# Patient Record
Sex: FEMALE | Race: WHITE
[De-identification: names, ages, dates, MRNs, and addresses within clinical notes are randomized per-mention and may not be internally consistent; named-entity substitution may affect disease eponyms.]

---

## 2022-05-09 ENCOUNTER — HOSPITAL ENCOUNTER (INPATIENT)
Dept: HOSPITAL 33 - OB | Age: 27
LOS: 3 days | Discharge: HOME | End: 2022-05-12
Attending: OBSTETRICS & GYNECOLOGY | Admitting: OBSTETRICS & GYNECOLOGY
Payer: COMMERCIAL

## 2022-05-09 ENCOUNTER — HOSPITAL ENCOUNTER (OUTPATIENT)
Dept: HOSPITAL 33 - OB | Age: 27
Setting detail: OBSERVATION
Discharge: HOME | End: 2022-05-09
Attending: OBSTETRICS & GYNECOLOGY | Admitting: OBSTETRICS & GYNECOLOGY
Payer: COMMERCIAL

## 2022-05-09 VITALS — OXYGEN SATURATION: 98 %

## 2022-05-09 VITALS — HEART RATE: 95 BPM

## 2022-05-09 VITALS — SYSTOLIC BLOOD PRESSURE: 133 MMHG | DIASTOLIC BLOOD PRESSURE: 73 MMHG

## 2022-05-09 DIAGNOSIS — Z3A.37: ICD-10-CM

## 2022-05-09 DIAGNOSIS — Z20.828: ICD-10-CM

## 2022-05-09 DIAGNOSIS — Z34.03: Primary | ICD-10-CM

## 2022-05-09 DIAGNOSIS — D62: ICD-10-CM

## 2022-05-09 LAB
AMPHETAMINES UR QL: NEGATIVE
BARBITURATES UR QL: NEGATIVE
BASOPHILS # BLD AUTO: 0.02 10*3/UL (ref 0–0.4)
BENZODIAZ UR QL SCN: NEGATIVE
COCAINE UR QL SCN: NEGATIVE
EOSINOPHIL # BLD AUTO: 0.01 10*3/UL (ref 0–0.5)
HCT VFR BLD AUTO: 32.4 % (ref 35–47)
HGB BLD-MCNC: 9.2 GM/DL (ref 12–16)
LYMPHOCYTES # SPEC AUTO: 1.08 10*3/UL (ref 1–4.6)
MCH RBC QN AUTO: 20.2 PG (ref 26–32)
MCHC RBC AUTO-ENTMCNC: 28.4 G/DL (ref 32–36)
METHADONE UR QL: NEGATIVE
MONOCYTES # BLD AUTO: 0.58 10*3/UL (ref 0–1.3)
OPIATES UR QL: NEGATIVE
PCP UR QL CFM>20 NG/ML: NEGATIVE
PLATELET # BLD AUTO: 215 K/MM3 (ref 150–450)
RBC # BLD AUTO: 4.55 M/MM3 (ref 4.1–5.4)
THC UR QL SCN: NEGATIVE
WBC # BLD AUTO: 13 K/MM3 (ref 4–10.5)

## 2022-05-09 PROCEDURE — 87340 HEPATITIS B SURFACE AG IA: CPT

## 2022-05-09 PROCEDURE — 80053 COMPREHEN METABOLIC PANEL: CPT

## 2022-05-09 PROCEDURE — G0378 HOSPITAL OBSERVATION PER HR: HCPCS

## 2022-05-09 PROCEDURE — 80307 DRUG TEST PRSMV CHEM ANLYZR: CPT

## 2022-05-09 PROCEDURE — 76942 ECHO GUIDE FOR BIOPSY: CPT

## 2022-05-09 PROCEDURE — 64488 TAP BLOCK BI INJECTION: CPT

## 2022-05-09 PROCEDURE — 85610 PROTHROMBIN TIME: CPT

## 2022-05-09 PROCEDURE — 59510 CESAREAN DELIVERY: CPT

## 2022-05-09 PROCEDURE — 96372 THER/PROPH/DIAG INJ SC/IM: CPT

## 2022-05-09 PROCEDURE — 90472 IMMUNIZATION ADMIN EACH ADD: CPT

## 2022-05-09 PROCEDURE — 76816 OB US FOLLOW-UP PER FETUS: CPT

## 2022-05-09 PROCEDURE — 94799 UNLISTED PULMONARY SVC/PX: CPT

## 2022-05-09 PROCEDURE — 90715 TDAP VACCINE 7 YRS/> IM: CPT

## 2022-05-09 PROCEDURE — 85730 THROMBOPLASTIN TIME PARTIAL: CPT

## 2022-05-09 PROCEDURE — 36430 TRANSFUSION BLD/BLD COMPNT: CPT

## 2022-05-09 PROCEDURE — 76937 US GUIDE VASCULAR ACCESS: CPT

## 2022-05-09 PROCEDURE — 36415 COLL VENOUS BLD VENIPUNCTURE: CPT

## 2022-05-09 PROCEDURE — 85027 COMPLETE CBC AUTOMATED: CPT

## 2022-05-09 PROCEDURE — 86922 COMPATIBILITY TEST ANTIGLOB: CPT

## 2022-05-09 PROCEDURE — 85025 COMPLETE CBC W/AUTO DIFF WBC: CPT

## 2022-05-09 NOTE — XRAY
Exam: OB follow-up ultrasound from 05/09/2022.



Comparison: OB ultrasound greater than 14 weeks from 01/11/2022.



Indication: Fetal weight.



Findings: Transabdominal scans reveal a single live intrauterine fetus in the

cephalic lie.  Fetal cardiac activity measured 133 beats per minute.  The

placenta is partially seen and appears to be anterior, as noted on the prior

exam from 01/11/2022.  The amniotic fluid index measures 18.45 cm which is

within normal limits.



Fetal biometry:

The biparietal diameter measures 9.32 cm consistent with a gestational age of

37 weeks, 6 days.

Head circumference measures 33.48 cm consistent with a gestational age of 38

weeks, 2 days.

The abdominal circumference measures 34.52 cm consistent with a gestational

age of 38 weeks, 3 days.

The femur length measures 7.35 cm consistent with a gestational age of 37

weeks, 4 days.



Average gestational age based on all of the above measurements is 38 weeks, 0

days plus or -2 weeks, 5 days yielding an estimated due date of 05/23/2022.

This is 4 days behind that anticipated by the prior OB ultrasound from

01/11/2022 and is within normal range of variability.  Estimated due date by

dates is 05/27/2022.



Estimated fetal weight is 3412 g plus or -511.84 g (7 pounds, 8 oz.+ or -1

pound, 2 ounces).  This places the fetus in the 77.1 percentile.  Fetal size

ratios are all within normal limits.  The maternal cervical canal was not

measured by the ultrasound technologist.



Impression:



1.  Fetal biometry measurements suggest a gestational age of 38 weeks, 0 days

plus or -2 weeks, 5 days which is only 4 days behind that anticipated by the

prior OB ultrasound from 01/11/2022.  See above.



2.  Estimated fetal weight is 3412 g plus or -511.84 g (7 pounds, 8 oz.+ or -1

pound, 2 ounces) placing the fetus in the 77.1 percentile.



3.  The placenta is partially seen and again appears to be anterior.



4.  The fetus is in the cephalic lie and demonstrates a fetal heart rate of

133 bpm.



5.  Amniotic fluid index measures 18.45 cm which is within normal limits.

## 2022-05-10 LAB
APTT PPP: 24.5 SECONDS (ref 25.1–36.5)
BLD SMEAR INTERP: YES
BLD SMEAR INTERP: YES
HCT VFR BLD AUTO: 25.5 % (ref 35–47)
HGB BLD-MCNC: 7.2 GM/DL (ref 12–16)
INR PPP: 1.01 (ref 0.8–3)
MCH RBC QN AUTO: 20.5 PG (ref 26–32)
MCHC RBC AUTO-ENTMCNC: 28.2 G/DL (ref 32–36)
PLATELET # BLD AUTO: 219 K/MM3 (ref 150–450)
PROTHROMBIN TIME: 11.9 SECONDS (ref 9.4–12.5)
RBC # BLD AUTO: 3.52 M/MM3 (ref 4.1–5.4)
WBC # BLD AUTO: 21.6 K/MM3 (ref 4–10.5)

## 2022-05-10 RX ADMIN — DOCUSATE SODIUM SCH MG: 100 CAPSULE, LIQUID FILLED ORAL at 22:59

## 2022-05-10 RX ADMIN — ACETAMINOPHEN PRN MG: 500 TABLET ORAL at 20:41

## 2022-05-10 RX ADMIN — GENTAMICIN SULFATE SCH MLS/HR: 40 INJECTION, SOLUTION INTRAMUSCULAR; INTRAVENOUS at 19:26

## 2022-05-11 LAB
ALBUMIN SERPL-MCNC: 2.4 G/DL (ref 3.5–5)
ALBUMIN SERPL-MCNC: 2.7 G/DL (ref 3.5–5)
ALP SERPL-CCNC: 273 U/L (ref 38–126)
ALP SERPL-CCNC: 280 U/L (ref 38–126)
ALT SERPL-CCNC: 14 U/L (ref 0–35)
ALT SERPL-CCNC: 17 U/L (ref 0–35)
ANION GAP SERPL CALC-SCNC: 8.1 MEQ/L (ref 5–15)
ANION GAP SERPL CALC-SCNC: 9.8 MEQ/L (ref 5–15)
AST SERPL QL: 41 U/L (ref 14–36)
AST SERPL QL: 44 U/L (ref 14–36)
BASOPHILS # BLD AUTO: 0.01 10*3/UL (ref 0–0.4)
BILIRUB BLD-MCNC: 0.6 MG/DL (ref 0.2–1.3)
BILIRUB BLD-MCNC: 0.8 MG/DL (ref 0.2–1.3)
BLD SMEAR INTERP: YES
BUN SERPL-MCNC: 11 MG/DL (ref 7–17)
BUN SERPL-MCNC: 8 MG/DL (ref 7–17)
CALCIUM SPEC-MCNC: 8 MG/DL (ref 8.4–10.2)
CALCIUM SPEC-MCNC: 8.2 MG/DL (ref 8.4–10.2)
CHLORIDE SERPL-SCNC: 103 MMOL/L (ref 98–107)
CHLORIDE SERPL-SCNC: 104 MMOL/L (ref 98–107)
CO2 SERPL-SCNC: 24 MMOL/L (ref 22–30)
CO2 SERPL-SCNC: 26 MMOL/L (ref 22–30)
CREAT SERPL-MCNC: 0.68 MG/DL (ref 0.52–1.04)
CREAT SERPL-MCNC: 0.81 MG/DL (ref 0.52–1.04)
EOSINOPHIL # BLD AUTO: 0.03 10*3/UL (ref 0–0.5)
GFR SERPLBLD BASED ON 1.73 SQ M-ARVRAT: > 60 ML/MIN
GFR SERPLBLD BASED ON 1.73 SQ M-ARVRAT: > 60 ML/MIN
GLUCOSE SERPL-MCNC: 102 MG/DL (ref 74–106)
GLUCOSE SERPL-MCNC: 107 MG/DL (ref 74–106)
HCT VFR BLD AUTO: 28.2 % (ref 35–47)
HCT VFR BLD AUTO: 29.8 % (ref 35–47)
HCT VFR BLD AUTO: 31 % (ref 35–47)
HGB BLD-MCNC: 8.6 GM/DL (ref 12–16)
HGB BLD-MCNC: 8.9 GM/DL (ref 12–16)
HGB BLD-MCNC: 9.2 GM/DL (ref 12–16)
LYMPHOCYTES # SPEC AUTO: 1.5 10*3/UL (ref 1–4.6)
MCH RBC QN AUTO: 22.3 PG (ref 26–32)
MCH RBC QN AUTO: 22.6 PG (ref 26–32)
MCH RBC QN AUTO: 22.8 PG (ref 26–32)
MCHC RBC AUTO-ENTMCNC: 29.7 G/DL (ref 32–36)
MCHC RBC AUTO-ENTMCNC: 29.9 G/DL (ref 32–36)
MCHC RBC AUTO-ENTMCNC: 30.5 G/DL (ref 32–36)
MONOCYTES # BLD AUTO: 1.3 10*3/UL (ref 0–1.3)
PLATELET # BLD AUTO: 165 K/MM3 (ref 150–450)
PLATELET # BLD AUTO: 175 K/MM3 (ref 150–450)
PLATELET # BLD AUTO: 213 K/MM3 (ref 150–450)
POTASSIUM SERPLBLD-SCNC: 3.9 MMOL/L (ref 3.5–5.1)
POTASSIUM SERPLBLD-SCNC: 4.1 MMOL/L (ref 3.5–5.1)
PROT SERPL-MCNC: 5 G/DL (ref 6.3–8.2)
PROT SERPL-MCNC: 5.5 G/DL (ref 6.3–8.2)
RBC # BLD AUTO: 3.77 M/MM3 (ref 4.1–5.4)
RBC # BLD AUTO: 3.93 M/MM3 (ref 4.1–5.4)
RBC # BLD AUTO: 4.12 M/MM3 (ref 4.1–5.4)
SODIUM SERPL-SCNC: 131 MMOL/L (ref 137–145)
SODIUM SERPL-SCNC: 135 MMOL/L (ref 137–145)
WBC # BLD AUTO: 16.8 K/MM3 (ref 4–10.5)
WBC # BLD AUTO: 18.1 K/MM3 (ref 4–10.5)
WBC # BLD AUTO: 18.6 K/MM3 (ref 4–10.5)

## 2022-05-11 RX ADMIN — CEFEPIME HYDROCHLORIDE SCH MLS/HR: 2 INJECTION, POWDER, FOR SOLUTION INTRAVENOUS at 20:01

## 2022-05-11 RX ADMIN — ENOXAPARIN SODIUM SCH MG: 100 INJECTION SUBCUTANEOUS at 08:01

## 2022-05-11 RX ADMIN — CEFEPIME HYDROCHLORIDE SCH MLS/HR: 2 INJECTION, POWDER, FOR SOLUTION INTRAVENOUS at 14:20

## 2022-05-11 RX ADMIN — IBUPROFEN PRN MG: 400 TABLET ORAL at 12:36

## 2022-05-11 RX ADMIN — FERROUS SULFATE TAB 325 MG (65 MG ELEMENTAL FE) SCH MG: 325 (65 FE) TAB at 09:25

## 2022-05-11 RX ADMIN — DOCUSATE SODIUM SCH MG: 100 CAPSULE, LIQUID FILLED ORAL at 08:01

## 2022-05-11 RX ADMIN — GENTAMICIN SULFATE SCH MLS/HR: 40 INJECTION, SOLUTION INTRAMUSCULAR; INTRAVENOUS at 04:00

## 2022-05-11 NOTE — OP
SURGERY DATE/TIME:  05/10/2022  1201 



PREOPERATIVE DIAGNOSIS:      Intrauterine pregnancy at 37 weeks and 4 days gestation with 
arrest of descent and dilatation in labor and chronic hypertension.



POSTOPERATIVE DIAGNOSIS:     Intrauterine pregnancy at 37 weeks and 4 days gestation with 
arrest of descent and dilatation in labor and chronic hypertension.



PROCEDURE:  Primary  section, low flap transverse uterine incision, Pfannenstiel 
skin incision.



SURGEON:        Adonay Harris D.O.



ASSISTANT:      Leonardo Walter, surgical technician.



ANESTHESIA:    Epidural. 



ESTIMATED BLOOD LOSS:  400 cc. 



COMPLICATIONS:  None. 



INDICATIONS:  The risks, benefits, indications and alternatives of the procedure were 
reviewed with the patient prior to procedure. The patient understood the risk of 
infection, bleeding, bowel injury, bladder injury, ureteral injury, uterine perforation, 
pelvic infection and thromboembolic disorder associated with the surgery and desires to 
have the surgery as a possible means to alleviate her current medical condition. 

      

DESCRIPTION OF PROCEDURE AND FINDINGS:  At this point the patient is taken to the 
operating room where her epidural anesthesia was found to be adequate. She was then 
prepared and draped in normal sterile fashion in the dorsal supine position with a 
leftward tilt. A Pfannenstiel skin incision is made with a scalpel and carried through to 
the underlying layer of the fascia with Bovie. The fascia was then incised in the midline 
and the incision extended laterally with Nolen scissors. The superior aspect of the fascial 
incision was then grasped Kocher clamps elevated and the underlying rectus muscles 
dissected off bluntly. Attention is then turned to the inferior aspect of this incision 
which in similar fashion was grasped, tented up with Kocher clamps and the rectus muscles 
dissected off bluntly. The rectus muscles were then  at the midline and the 
peritoneum identified tented up and entered sharply with Metzenbaum scissors. The 
peritoneal incision was then extended superiorly and inferiorly with good visualization of 
the bladder. The bladder blade was then inserted and the vesicouterine peritoneum 
identified, grasped with pickups and entered sharply with Metzenbaum scissors. This 
incision was then extended laterally and bladder flap created digitally. The bladder blade 
was then reinserted and the lower uterine segment incised in transverse fashion with a 
scalpel. The uterine incision was then extended laterally with Nolen scissors. The bladder 
blade was then removed. The infant's head was delivered atraumatically. The nose and mouth 
were suctioned with bulb suction and the cord clamped and cut. The infant was then handed 
off to awaiting nurses. The placenta was then removed manually. The uterus exteriorized 
and cleared of all clots and debris. The uterine incision was repaired with 1-0 chromic in 
a running locked fashion. A second layer of the same suture was used to obtain excellent 
hemostasis. The uterus is then returned to the abdomen. The gutters were cleared of clots 
and the peritoneal muscle closed in interrupted fashion using 2-0 chromic suture. The 
fascia was re-approximated with 0 Vicryl in a running fashion. The subcutaneous layer was 
closed with 3-0 Vicryl suture and the skin was closed with absorbable staples called 
INSORB. The patient tolerated the procedure well. Sponge, lap, needle and instrument 
counts were correct x2. The patient was then taken to the recovery room in stable 
condition. The patient delivered a live baby boy at 1224 hours, weight of the baby was 10 
pounds 2 ounces and Apgar's were 8 at 1 minute and 9 at 5 minutes.

## 2022-05-11 NOTE — PCM.NOTE
Date and Time: 22  0733





Subjective Assessment: 





pod 1 sp csection


pt resting in bed and doing well.  ambulating and tolerating diet


vss afebrile


abd; soft incision with dressing intact with minimal soiling


uterus; firm


lochia; mild





hgb; 8.6





a/p sp csection pod 1


  will repeat cbc at noon today


  encourage ambulation





OBJECTIVE DATA


Vital Signs: 


                               Vital Signs - 24 hr











  Temp Pulse Resp BP BP Pulse Ox


 


 22 06:55   99 H     94 L


 


 22 06:00       100


 


 22 05:00       96


 


 22 04:00  98.6 F  108 H  18   134/72  99


 


 22 03:00       100


 


 22 02:00       100


 


 22 01:00  98.4 F  104 H  18   141/63  100


 


 22 00:00  98.7 F  98 H  18   141/67  100


 


 05/10/22 23:00       100


 


 05/10/22 22:00       100


 


 05/10/22 21:00       100


 


 05/10/22 20:00  98.8 F  99 H  20   137/68  100


 


 05/10/22 19:33  98.6 F  20 L  20   129/76  100


 


 05/10/22 19:00       100


 


 05/10/22 18:00       100


 


 05/10/22 17:00       100


 


 05/10/22 16:15  98.4 F  95 H  18   123/59  100


 


 05/10/22 16:00       100


 


 05/10/22 15:45  98.4 F  88  18   122/58  100


 


 05/10/22 15:30  98.4 F  107 H  18   121/58  100


 


 05/10/22 15:00  98.4 F  106 H  18   120/57  99


 


 05/10/22 14:45  98.7 F  106 H  20   106/55  100


 


 05/10/22 14:30  98.7 F  101 H  20   120/55  100


 


 05/10/22 14:15  98.7 F  113 H  20   120/55  100


 


 05/10/22 14:00  98.7 F  93 H  18   141/74  100


 


 05/10/22 11:10  98.7 F  106 H  20    100


 


 05/10/22 10:31  98.7 F  94 H  20   134/63  100


 


 05/10/22 10:15  98.7 F  92 H  20  132/67  


 


 05/10/22 10:00  98.7 F  92 H  20  143/85  


 


 05/10/22 09:45  98.7 F  102 H  20  116/54  


 


 05/10/22 09:30  98.7 F  85  20  133/60  


 


 05/10/22 09:15  98.7 F  94 H  20  140/69   100


 


 05/10/22 09:00  98.7 F  88  20  123/59   100


 


 05/10/22 08:45  98.7 F  86  20  134/65   100


 


 05/10/22 08:30  98.7 F  100 H  20  125/61   100


 


 05/10/22 08:15  98.7 F  81  20  135/68   100


 


 05/10/22 08:00  98.7 F  109 H  20  140/66   96


 


 05/10/22 07:45  98.7 F  109 H  20  161/61   96








                        Pain Assessment - Last Documented











Pain Intensity [Bilateral      7





Lower]                         


 


Pain Intensity [Anterior/      7





Posterior]                     


 


Pain Intensity                 0


 


Pain Scale Used                0-10 Pain Scale











Intake and Output: 


                                 Intake & Output











 05/08/22 05/09/22 05/10/22 05/11/22





 11:59 11:59 11:59 11:59


 


Intake Total   3577 3950


 


Output Total   500 2475


 


Balance   3077 1475


 


Weight   127.006 kg 











Lab Results: 


                            Lab Results-Last 24 Hours











  05/09/22 05/09/22 05/10/22 Range/Units





  11:31 11:31 10:27 


 


WBC     (4.0-10.5)  K/mm3


 


RBC     (4.1-5.4)  M/mm3


 


Hgb     (12.0-16.0)  gm/dl


 


Hct     (35-47)  %


 


MCV     ()  fl


 


MCH     (26-32)  pg


 


MCHC     (32-36)  g/dl


 


RDW     (11.5-14.0)  %


 


Plt Count     (150-450)  K/mm3


 


MPV     (7.5-11.0)  fl


 


Gran %     (36.0-66.0)  %


 


Eos # (Auto)     (0-0.5)  


 


Absolute Lymphs (auto)     (1.0-4.6)  


 


Absolute Monos (auto)     (0.0-1.3)  


 


Lymphocytes %     (24.0-44.0)  %


 


Monocytes %     (0.0-12.0)  %


 


Eosinophils %     (0.00-5.0)  %


 


Basophils %     (0.0-0.4)  %


 


Absolute Granulocytes     (1.4-6.9)  


 


Basophils #     (0-0.4)  


 


PT    11.9  (9.4-12.5)  SECONDS


 


INR    1.01  (0.8-3.0)  


 


APTT    24.5 L  (25.1-36.5)  SECONDS


 


Sodium     (137-145)  mmol/L


 


Potassium     (3.5-5.1)  mmol/L


 


Chloride     ()  mmol/L


 


Carbon Dioxide     (22-30)  mmol/L


 


Anion Gap     (5-15)  MEQ/L


 


BUN     (7-17)  mg/dL


 


Creatinine     (0.52-1.04)  mg/dL


 


Estimated GFR     ML/MIN


 


Glucose     ()  mg/dL


 


Calcium     (8.4-10.2)  mg/dL


 


Total Bilirubin     (0.2-1.3)  mg/dL


 


AST     (14-36)  U/L


 


ALT     (0-35)  U/L


 


Alkaline Phosphatase     ()  U/L


 


Serum Total Protein     (6.3-8.2)  g/dL


 


Albumin     (3.5-5.0)  g/dL


 


Slides for Path Review     


 


Crossmatch  COMPATIBLE  COMPATIBLE   (COMPATIBLE)  














  05/10/22 05/11/22 05/11/22 Range/Units





  18:11 01:40 06:14 


 


WBC  21.6 H  18.1 H  16.8 H  (4.0-10.5)  K/mm3


 


RBC  3.52 L  3.93 L  3.77 L  (4.1-5.4)  M/mm3


 


Hgb  7.2 L D  8.9 L D  8.6 L  (12.0-16.0)  gm/dl


 


Hct  25.5 L  29.8 L  28.2 L  (35-47)  %


 


MCV  72.4 L  75.8 L  74.8 L  ()  fl


 


MCH  20.5 L  22.6 L  22.8 L  (26-32)  pg


 


MCHC  28.2 L  29.9 L  30.5 L  (32-36)  g/dl


 


RDW  18.9 H  22.0 H  21.2 H  (11.5-14.0)  %


 


Plt Count  219  165  175  (150-450)  K/mm3


 


MPV  11.8 H  10.4  11.0  (7.5-11.0)  fl


 


Gran %    83.1 H  (36.0-66.0)  %


 


Eos # (Auto)    0.03  (0-0.5)  


 


Absolute Lymphs (auto)    1.50  (1.0-4.6)  


 


Absolute Monos (auto)    1.30  (0.0-1.3)  


 


Lymphocytes %    8.9 L  (24.0-44.0)  %


 


Monocytes %    7.7  (0.0-12.0)  %


 


Eosinophils %    0.2  (0.00-5.0)  %


 


Basophils %    0.1  (0.0-0.4)  %


 


Absolute Granulocytes    13.99 H  (1.4-6.9)  


 


Basophils #    0.01  (0-0.4)  


 


PT     (9.4-12.5)  SECONDS


 


INR     (0.8-3.0)  


 


APTT     (25.1-36.5)  SECONDS


 


Sodium     (137-145)  mmol/L


 


Potassium     (3.5-5.1)  mmol/L


 


Chloride     ()  mmol/L


 


Carbon Dioxide     (22-30)  mmol/L


 


Anion Gap     (5-15)  MEQ/L


 


BUN     (7-17)  mg/dL


 


Creatinine     (0.52-1.04)  mg/dL


 


Estimated GFR     ML/MIN


 


Glucose     ()  mg/dL


 


Calcium     (8.4-10.2)  mg/dL


 


Total Bilirubin     (0.2-1.3)  mg/dL


 


AST     (14-36)  U/L


 


ALT     (0-35)  U/L


 


Alkaline Phosphatase     ()  U/L


 


Serum Total Protein     (6.3-8.2)  g/dL


 


Albumin     (3.5-5.0)  g/dL


 


Slides for Path Review  YES    


 


Crossmatch     (COMPATIBLE)  














  22 Range/Units





  06:14 


 


WBC   (4.0-10.5)  K/mm3


 


RBC   (4.1-5.4)  M/mm3


 


Hgb   (12.0-16.0)  gm/dl


 


Hct   (35-47)  %


 


MCV   ()  fl


 


MCH   (26-32)  pg


 


MCHC   (32-36)  g/dl


 


RDW   (11.5-14.0)  %


 


Plt Count   (150-450)  K/mm3


 


MPV   (7.5-11.0)  fl


 


Gran %   (36.0-66.0)  %


 


Eos # (Auto)   (0-0.5)  


 


Absolute Lymphs (auto)   (1.0-4.6)  


 


Absolute Monos (auto)   (0.0-1.3)  


 


Lymphocytes %   (24.0-44.0)  %


 


Monocytes %   (0.0-12.0)  %


 


Eosinophils %   (0.00-5.0)  %


 


Basophils %   (0.0-0.4)  %


 


Absolute Granulocytes   (1.4-6.9)  


 


Basophils #   (0-0.4)  


 


PT   (9.4-12.5)  SECONDS


 


INR   (0.8-3.0)  


 


APTT   (25.1-36.5)  SECONDS


 


Sodium  131 L  (137-145)  mmol/L


 


Potassium  3.9  (3.5-5.1)  mmol/L


 


Chloride  103  ()  mmol/L


 


Carbon Dioxide  24  (22-30)  mmol/L


 


Anion Gap  8.1  (5-15)  MEQ/L


 


BUN  11  (7-17)  mg/dL


 


Creatinine  0.81  (0.52-1.04)  mg/dL


 


Estimated GFR  > 60.0  ML/MIN


 


Glucose  102  ()  mg/dL


 


Calcium  8.0 L  (8.4-10.2)  mg/dL


 


Total Bilirubin  0.80  (0.2-1.3)  mg/dL


 


AST  41 H  (14-36)  U/L


 


ALT  14  (0-35)  U/L


 


Alkaline Phosphatase  273 H  ()  U/L


 


Serum Total Protein  5.0 L  (6.3-8.2)  g/dL


 


Albumin  2.4 L  (3.5-5.0)  g/dL


 


Slides for Path Review   


 


Crossmatch   (COMPATIBLE)  











Radiology Exams: 


                              Radiology Procedures











 Category Date Time Status


 


 OB FOLLOW UP PER FETUS [US] Stat Exams  22 19:31 Completed











Multi-Disciplinary Progress Notes: 


                        Multi-Disciplinary Progress Notes





05/10/22 14:11 Respiratory Note by Shelly Smart


Baby delivered by .  Baby crying from birth and pinked up.  Baby dried 

and stimulated under the warmer.





Initialized on 05/10/22 14:11 - END OF NOTE

















Assessment/Plan


(1) Arrest of descent, delivered, current hospitalization


Current Visit: Yes   Status: Acute   Code(s): O62.1 - SECONDARY UTERINE INERTIA 

  





(2) History of primary  section


Current Visit: Yes   Status: Acute   Code(s): Z98.891 - HISTORY OF UTERINE SCAR 

FROM PREVIOUS SURGERY   





(3) Status post primary low transverse  section


Current Visit: Yes   Status: Acute   Code(s): Z98.891 - HISTORY OF UTERINE SCAR 

FROM PREVIOUS SURGERY   





(4) Postoperative anemia due to acute blood loss


Current Visit: Yes   Status: Acute   Code(s): D62 - ACUTE POSTHEMORRHAGIC ANEMIA

## 2022-05-11 NOTE — PCM.DS
Discharge Summary


Date of Admission: 


22 17:34





Admitting Physician: 


RAMAN SHELTON DO





Consults: 





                                Consults on Case





22 20:25


Notify  Anesthesia Provider PRN 





05/10/22 10:04


Notify  Anesthesia Provider PRN 


Notify  Anesthesia Provider ROUTINE 


Notify Physician OF ADMISSION 





05/10/22 16:16


 Navigation ONCE 











Primary Care Provider: 


LEROY DAVIES








Allergies


Allergies





No Known Drug Allergies Allergy (Verified 22 03:45)


   











Hospital Summary





- Hospital Course


Hospital Course: 





pt was admitted on may 9 for being in labor at 37 3/7 wks gestation who was 

scheduled for csection on  for suspected fetal macrosomia and chronic htn. 

pt had sonogram in labor delivery on may 9 while in labor and baby was noted to 

being about 7 pounds 8 oz.  pt progressed to complete dilitation however had an 

arrest of descent.  pt subsequently underwent csection and delivered live baby 

boy without complication however had an ebl of 1000cc.  pt was subsequently 

transfursed 2 units of prbc upon seeing her hgb to 7.2.  posttransfusion hgb 

9.0.  pt currently feeling well and stable for discharge on may 12.  pt given 

norco for pain management and was advised to fu in office in 2 wks.  pt received

additional zosyn antibiotic when wbc went to 18.6 secondary to elevated bmi.  

all questions answered to her satisfaction.





- Vitals & Intake/Output


Vital Signs: 





                                   Vital Signs











Temperature  98.5 F   22 14:00


 


Pulse Rate  110 H  22 14:00


 


Respiratory Rate  18   22 14:00


 


Blood Pressure  138/71   22 14:00


 


O2 Sat by Pulse Oximetry  97   22 11:00











Intake & Output: 





                                 Intake & Output











 05/09/22 05/10/22 05/11/22 05/12/22





 11:59 11:59 11:59 11:59


 


Intake Total  3577 3950 


 


Output Total  500 3325 


 


Balance  3077 625 


 


Weight  127.006 kg  














- Lab


Result Diagrams: 


                                 22 13:06





                                 22 13:06


Lab Results-Last 24 Hrs: 





                            Lab Results-Last 24 Hours











  05/09/22 05/09/22 05/10/22 Range/Units





  11:31 11:31 10:27 


 


WBC     (4.0-10.5)  K/mm3


 


RBC     (4.1-5.4)  M/mm3


 


Hgb     (12.0-16.0)  gm/dl


 


Hct     (35-47)  %


 


MCV     ()  fl


 


MCH     (26-32)  pg


 


MCHC     (32-36)  g/dl


 


RDW     (11.5-14.0)  %


 


Plt Count     (150-450)  K/mm3


 


MPV     (7.5-11.0)  fl


 


Gran %     (36.0-66.0)  %


 


Eos # (Auto)     (0-0.5)  


 


Absolute Lymphs (auto)     (1.0-4.6)  


 


Absolute Monos (auto)     (0.0-1.3)  


 


Lymphocytes %     (24.0-44.0)  %


 


Monocytes %     (0.0-12.0)  %


 


Eosinophils %     (0.00-5.0)  %


 


Basophils %     (0.0-0.4)  %


 


Absolute Granulocytes     (1.4-6.9)  


 


Basophils #     (0-0.4)  


 


Sodium     (137-145)  mmol/L


 


Potassium     (3.5-5.1)  mmol/L


 


Chloride     ()  mmol/L


 


Carbon Dioxide     (22-30)  mmol/L


 


Anion Gap     (5-15)  MEQ/L


 


BUN     (7-17)  mg/dL


 


Creatinine     (0.52-1.04)  mg/dL


 


Estimated GFR     ML/MIN


 


Glucose     ()  mg/dL


 


Calcium     (8.4-10.2)  mg/dL


 


Total Bilirubin     (0.2-1.3)  mg/dL


 


AST     (14-36)  U/L


 


ALT     (0-35)  U/L


 


Alkaline Phosphatase     ()  U/L


 


Serum Total Protein     (6.3-8.2)  g/dL


 


Albumin     (3.5-5.0)  g/dL


 


Hep Bs Antigen    Negative  (Negative)  


 


Slides for Path Review     


 


Crossmatch  COMPATIBLE  COMPATIBLE   (COMPATIBLE)  














  05/10/22 05/11/22 05/11/22 Range/Units





  18:11 01:40 06:14 


 


WBC  21.6 H  18.1 H  16.8 H  (4.0-10.5)  K/mm3


 


RBC  3.52 L  3.93 L  3.77 L  (4.1-5.4)  M/mm3


 


Hgb  7.2 L D  8.9 L D  8.6 L  (12.0-16.0)  gm/dl


 


Hct  25.5 L  29.8 L  28.2 L  (35-47)  %


 


MCV  72.4 L  75.8 L  74.8 L  ()  fl


 


MCH  20.5 L  22.6 L  22.8 L  (26-32)  pg


 


MCHC  28.2 L  29.9 L  30.5 L  (32-36)  g/dl


 


RDW  18.9 H  22.0 H  21.2 H  (11.5-14.0)  %


 


Plt Count  219  165  175  (150-450)  K/mm3


 


MPV  11.8 H  10.4  11.0  (7.5-11.0)  fl


 


Gran %    83.1 H  (36.0-66.0)  %


 


Eos # (Auto)    0.03  (0-0.5)  


 


Absolute Lymphs (auto)    1.50  (1.0-4.6)  


 


Absolute Monos (auto)    1.30  (0.0-1.3)  


 


Lymphocytes %    8.9 L  (24.0-44.0)  %


 


Monocytes %    7.7  (0.0-12.0)  %


 


Eosinophils %    0.2  (0.00-5.0)  %


 


Basophils %    0.1  (0.0-0.4)  %


 


Absolute Granulocytes    13.99 H  (1.4-6.9)  


 


Basophils #    0.01  (0-0.4)  


 


Sodium     (137-145)  mmol/L


 


Potassium     (3.5-5.1)  mmol/L


 


Chloride     ()  mmol/L


 


Carbon Dioxide     (22-30)  mmol/L


 


Anion Gap     (5-15)  MEQ/L


 


BUN     (7-17)  mg/dL


 


Creatinine     (0.52-1.04)  mg/dL


 


Estimated GFR     ML/MIN


 


Glucose     ()  mg/dL


 


Calcium     (8.4-10.2)  mg/dL


 


Total Bilirubin     (0.2-1.3)  mg/dL


 


AST     (14-36)  U/L


 


ALT     (0-35)  U/L


 


Alkaline Phosphatase     ()  U/L


 


Serum Total Protein     (6.3-8.2)  g/dL


 


Albumin     (3.5-5.0)  g/dL


 


Hep Bs Antigen     (Negative)  


 


Slides for Path Review  YES   YES  


 


Crossmatch     (COMPATIBLE)  














  2222 Range/Units





  06:14 13:06 13:06 


 


WBC   18.6 H   (4.0-10.5)  K/mm3


 


RBC   4.12   (4.1-5.4)  M/mm3


 


Hgb   9.2 L   (12.0-16.0)  gm/dl


 


Hct   31.0 L   (35-47)  %


 


MCV   75.2 L   ()  fl


 


MCH   22.3 L   (26-32)  pg


 


MCHC   29.7 L   (32-36)  g/dl


 


RDW   21.6 H   (11.5-14.0)  %


 


Plt Count   213   (150-450)  K/mm3


 


MPV   11.0   (7.5-11.0)  fl


 


Gran %     (36.0-66.0)  %


 


Eos # (Auto)     (0-0.5)  


 


Absolute Lymphs (auto)     (1.0-4.6)  


 


Absolute Monos (auto)     (0.0-1.3)  


 


Lymphocytes %     (24.0-44.0)  %


 


Monocytes %     (0.0-12.0)  %


 


Eosinophils %     (0.00-5.0)  %


 


Basophils %     (0.0-0.4)  %


 


Absolute Granulocytes     (1.4-6.9)  


 


Basophils #     (0-0.4)  


 


Sodium  131 L   135 L  (137-145)  mmol/L


 


Potassium  3.9   4.1  (3.5-5.1)  mmol/L


 


Chloride  103   104  ()  mmol/L


 


Carbon Dioxide  24   26  (22-30)  mmol/L


 


Anion Gap  8.1   9.8  (5-15)  MEQ/L


 


BUN  11   8  (7-17)  mg/dL


 


Creatinine  0.81   0.68  (0.52-1.04)  mg/dL


 


Estimated GFR  > 60.0   > 60.0  ML/MIN


 


Glucose  102   107 H  ()  mg/dL


 


Calcium  8.0 L   8.2 L  (8.4-10.2)  mg/dL


 


Total Bilirubin  0.80   0.60  (0.2-1.3)  mg/dL


 


AST  41 H   44 H  (14-36)  U/L


 


ALT  14   17  (0-35)  U/L


 


Alkaline Phosphatase  273 H   280 H  ()  U/L


 


Serum Total Protein  5.0 L   5.5 L  (6.3-8.2)  g/dL


 


Albumin  2.4 L   2.7 L  (3.5-5.0)  g/dL


 


Hep Bs Antigen     (Negative)  


 


Slides for Path Review     


 


Crossmatch     (COMPATIBLE)  














- Radiology Exams


Ordered Rad Exams-Entire Visit: 





                              Radiology Procedures











 Category Date Time Status


 


 OB FOLLOW UP PER FETUS [US] Stat Exams  22 19:31 Completed














- Procedures and Test


Procedures and Tests throughout Hospitalization: 





                            Therapy Orders & Screens





05/10/22 14:16


[facility] 


   Comment: 














Final Diagnosis/Problem List





- Final Discharge Diagnosis/Problem


(1) Arrest of descent, delivered, current hospitalization


Current Visit: Yes   Status: Acute   Code(s): O62.1 - SECONDARY UTERINE INERTIA 

  





(2) History of primary  section


Current Visit: Yes   Status: Acute   Code(s): Z98.891 - HISTORY OF UTERINE SCAR 

FROM PREVIOUS SURGERY   





(3) Status post primary low transverse  section


Current Visit: Yes   Status: Acute   Code(s): Z98.891 - HISTORY OF UTERINE SCAR 

FROM PREVIOUS SURGERY   





(4) Postoperative anemia due to acute blood loss


Current Visit: Yes   Status: Acute   Code(s): D62 - ACUTE POSTHEMORRHAGIC ANEMIA

   





- Discharge


Disposition: Home, Self-Care


Condition: Stable


Prescriptions: 


New


   Hydrocodone/Acetaminophen [Hydrocodone-Acetamin 5-325 mg***] 1 tab PO Q6HPRN 

PRN #30 tablet MDD 4


     PRN Reason: Pain





No Action


   Fluoxetine HCl 40 mg PO DAILY


Follow up with: 


LEROY DAVIES MD [Primary Care Provider] - 


RAMAN SHELTON DO [ACTIVE STAFF] - 2 weeks


(keep incision clean and dry


no heavy lifting)

## 2022-05-12 VITALS — HEART RATE: 101 BPM | SYSTOLIC BLOOD PRESSURE: 137 MMHG | OXYGEN SATURATION: 98 % | DIASTOLIC BLOOD PRESSURE: 81 MMHG

## 2022-05-12 LAB
HCT VFR BLD AUTO: 28 % (ref 35–47)
HGB BLD-MCNC: 8.3 GM/DL (ref 12–16)
MCH RBC QN AUTO: 22.6 PG (ref 26–32)
MCHC RBC AUTO-ENTMCNC: 29.6 G/DL (ref 32–36)
PLATELET # BLD AUTO: 197 K/MM3 (ref 150–450)
RBC # BLD AUTO: 3.67 M/MM3 (ref 4.1–5.4)
WBC # BLD AUTO: 17.2 K/MM3 (ref 4–10.5)

## 2022-05-12 RX ADMIN — ENOXAPARIN SODIUM SCH MG: 100 INJECTION SUBCUTANEOUS at 09:00

## 2022-05-12 RX ADMIN — ACETAMINOPHEN PRN MG: 500 TABLET ORAL at 02:07

## 2022-05-12 RX ADMIN — FERROUS SULFATE TAB 325 MG (65 MG ELEMENTAL FE) SCH: 325 (65 FE) TAB at 05:36

## 2022-05-12 RX ADMIN — IBUPROFEN PRN MG: 400 TABLET ORAL at 02:06

## 2022-05-12 RX ADMIN — DOCUSATE SODIUM SCH: 100 CAPSULE, LIQUID FILLED ORAL at 05:35

## 2022-05-12 RX ADMIN — IBUPROFEN PRN MG: 400 TABLET ORAL at 09:03
